# Patient Record
Sex: FEMALE | Race: BLACK OR AFRICAN AMERICAN | NOT HISPANIC OR LATINO | Employment: OTHER | URBAN - METROPOLITAN AREA
[De-identification: names, ages, dates, MRNs, and addresses within clinical notes are randomized per-mention and may not be internally consistent; named-entity substitution may affect disease eponyms.]

---

## 2024-09-09 ENCOUNTER — TELEPHONE (OUTPATIENT)
Dept: NEUROSURGERY | Facility: CLINIC | Age: 54
End: 2024-09-09
Payer: MEDICAID

## 2024-09-09 NOTE — TELEPHONE ENCOUNTER
Called to ask if patient had cd and report.  She stated no and I let her know ill try to get them pushed over.

## 2024-09-09 NOTE — PROGRESS NOTES
Subjective     Chief Complaint   Patient presents with    Back Pain         Previous Treatment:     HPI: Valery Cosme is a 53 y.o. female who presents to clinic with chronic low back pain.  Patient states that over the years she has been using heating pad and was recently prescribed gabapentin, meloxicam and hydrocodone.  Patient was just recently prescribed them so has not been able to have the full effect yet.  Patient states that she has low back pain that radiates down her bilateral lower extremities and ends at about her calf area.  Patient states that her right leg is more symptomatic than her left.  Patient states that she sometimes has difficulty walking due to the low back pain and also reports neurogenic claudication symptoms.  Patient admits to low back pain, pain and weakness in her lower extremities and muscle spasms.  Patient denies any numbness and tingling in her lower extremities, bowel or bladder incontinence or saddle anesthesia at this time.        PMH:  History reviewed. No pertinent past medical history.      Current Outpatient Medications:     Accu-Chek Guide test strip, TEST 2-3 TIMES A DAY, Disp: , Rfl:     albuterol sulfate  (90 Base) MCG/ACT inhaler, , Disp: , Rfl:     amitriptyline (ELAVIL) 25 MG tablet, Take 1 tablet by mouth every night at bedtime., Disp: , Rfl:     atorvastatin (LIPITOR) 80 MG tablet, Take 1 tablet by mouth every night at bedtime., Disp: , Rfl:     Blood Glucose Monitoring Suppl (Accu-Chek Guide Me) w/Device kit, TEST 2-3 TIMES PER DAY, Disp: , Rfl:     Buprenorphine HCl 150 MCG film, Apply  to cheek., Disp: , Rfl:     cetirizine (zyrTEC) 10 MG tablet, Take 1 tablet by mouth Daily., Disp: , Rfl:     gabapentin (NEURONTIN) 600 MG tablet, Take 1 tablet by mouth 3 times a day., Disp: , Rfl:     HYDROcodone-acetaminophen (NORCO) 7.5-325 MG per tablet, TAKE 1 TABLET BY MOUTH EVERY 6 HOURS AS NEEDED FOR 7 DAYS, Disp: , Rfl:     ibuprofen (ADVIL,MOTRIN) 800 MG  tablet, Take 1 tablet by mouth 3 times a day., Disp: , Rfl:     Lantus SoloStar 100 UNIT/ML injection pen, Inject 60 units every day by subcutaneous route in the evening for 30 days., Disp: , Rfl:     Lantus SoloStar 100 UNIT/ML injection pen, INJECT 60 UNITS EVERY DAY BY SUBCUTANEOUS ROUTE IN THE EVENING FOR 30 DAYS., Disp: , Rfl:     levothyroxine (SYNTHROID, LEVOTHROID) 150 MCG tablet, , Disp: , Rfl:     losartan (COZAAR) 50 MG tablet, Take 1 tablet by mouth Daily., Disp: , Rfl:     meloxicam (MOBIC) 15 MG tablet, TAKE 1 TABLET BY MOUTH EVERY DAY WITH A MEAL, Disp: , Rfl:     metFORMIN (GLUCOPHAGE) 1000 MG tablet, Take 1 tablet by mouth Every 12 (Twelve) Hours., Disp: , Rfl:     montelukast (SINGULAIR) 10 MG tablet, Take 1 tablet by mouth Daily., Disp: , Rfl:     pantoprazole (PROTONIX) 40 MG EC tablet, Take 1 tablet by mouth Daily., Disp: , Rfl:     tiZANidine (ZANAFLEX) 2 MG tablet, , Disp: , Rfl:     venlafaxine XR (EFFEXOR-XR) 75 MG 24 hr capsule, , Disp: , Rfl:      No Known Allergies     History reviewed. No pertinent surgical history.     History reviewed. No pertinent family history.      Social Hx:  Social History     Tobacco Use   Smoking Status Every Day    Current packs/day: 0.50    Types: Cigarettes    Passive exposure: Current   Smokeless Tobacco Never      Alcohol Use: Not on file      Social History     Substance and Sexual Activity   Drug Use Never          Review of Systems   Constitutional:  Positive for activity change.   HENT: Negative.     Eyes: Negative.    Respiratory: Negative.     Cardiovascular: Negative.    Gastrointestinal: Negative.    Endocrine: Negative.    Genitourinary: Negative.    Musculoskeletal:  Positive for arthralgias, back pain and myalgias.   Skin: Negative.    Allergic/Immunologic: Negative.    Neurological:  Positive for numbness (+tingling in hands).        Sharp stabbing pain that sometimes goes into legs    Psychiatric/Behavioral:  Positive for sleep disturbance.   "        Objective     /88   Pulse 88   Resp 18   Ht 165.1 cm (65\")   Wt 88 kg (194 lb)   SpO2 100%   BMI 32.28 kg/m²    Body mass index is 32.28 kg/m².      Physical Exam  Vitals reviewed.   Eyes:      Extraocular Movements: Extraocular movements intact.      Conjunctiva/sclera: Conjunctivae normal.      Pupils: Pupils are equal, round, and reactive to light.   Musculoskeletal:         General: Normal range of motion.      Cervical back: Normal range of motion.   Skin:     General: Skin is warm and dry.   Neurological:      General: No focal deficit present.      Mental Status: She is alert and oriented to person, place, and time.   Psychiatric:         Mood and Affect: Mood normal.         Speech: Speech normal.        Neurologic Exam     Mental Status   Oriented to person, place, and time.   Speech: speech is normal   Level of consciousness: alert  Knowledge: good.     Cranial Nerves     CN III, IV, VI   Pupils are equal, round, and reactive to light.    Motor Exam   Muscle bulk: normal  Right arm tone: normal  Left arm tone: normal  Right leg tone: normal  Left leg tone: normal    Strength   Right iliopsoas: 5/5  Left iliopsoas: 5/5  Right quadriceps: 5/5  Left quadriceps: 5/5  Right hamstrin/5  Left hamstrin/5  Right glutei: 5/5  Left glutei: 5/5  Right anterior tibial: 5/5  Left anterior tibial: 5/5  Right posterior tibial: 5/5  Left posterior tibial: 5/5          Results Review  I personally reviewed and interpreted the images from the following studies:     X-ray shows mild degenerative disc changes throughout low back            Assessment & Plan     MDM: Ivonnealmabrandie Cosme is a 53 y.o. female with a history of chronic low back pain.  In the past patient had used heating pads to help relieve her symptoms, but was recently prescribed gabapentin, meloxicam and Norco.  Unfortunately since she has just recently started these prescriptions and has not yet found relief.Patient has full " strength on physical exam and has no clonus present.  Patient has started physical therapy and going twice a week. I am ordering an MRI of her lumbar spine to look for any cord or nerve compression. Patient is to return to clinic after PT and her MRI is complete. Patient is to reach out with any questions or concerns.        Diagnosis Plan   1. Chronic bilateral low back pain with bilateral sciatica  MRI Lumbar Spine Without Contrast      2. Lumbar radiculopathy  MRI Lumbar Spine Without Contrast          Return After MRI and PT completed, for Recheck.        BMI cannot be calculated due to outdated height or weight values.  Please input a current height/weight in Vitals and re-renter BMIFOLLOWUP in Note to pull in correct documentation based on BMI range.         This patient was examined wearing appropriate personal protective equipment.            Chantelle Bowser PA-C    09/11/24  10:47 EDT      Part of this note may be an electronic transcription/translation of spoken language to printed text using the Dragon Dictation System.

## 2024-09-11 ENCOUNTER — OFFICE VISIT (OUTPATIENT)
Dept: NEUROSURGERY | Facility: CLINIC | Age: 54
End: 2024-09-11
Payer: MEDICAID

## 2024-09-11 VITALS
WEIGHT: 194 LBS | DIASTOLIC BLOOD PRESSURE: 88 MMHG | HEART RATE: 88 BPM | BODY MASS INDEX: 32.32 KG/M2 | OXYGEN SATURATION: 100 % | HEIGHT: 65 IN | SYSTOLIC BLOOD PRESSURE: 139 MMHG | RESPIRATION RATE: 18 BRPM

## 2024-09-11 DIAGNOSIS — M54.16 LUMBAR RADICULOPATHY: ICD-10-CM

## 2024-09-11 DIAGNOSIS — M54.41 CHRONIC BILATERAL LOW BACK PAIN WITH BILATERAL SCIATICA: Primary | ICD-10-CM

## 2024-09-11 DIAGNOSIS — G89.29 CHRONIC BILATERAL LOW BACK PAIN WITH BILATERAL SCIATICA: Primary | ICD-10-CM

## 2024-09-11 DIAGNOSIS — M54.42 CHRONIC BILATERAL LOW BACK PAIN WITH BILATERAL SCIATICA: Primary | ICD-10-CM

## 2024-09-11 PROCEDURE — 99203 OFFICE O/P NEW LOW 30 MIN: CPT

## 2024-09-11 PROCEDURE — 1160F RVW MEDS BY RX/DR IN RCRD: CPT

## 2024-09-11 PROCEDURE — 1159F MED LIST DOCD IN RCRD: CPT

## 2024-09-11 RX ORDER — INSULIN GLARGINE 100 [IU]/ML
INJECTION, SOLUTION SUBCUTANEOUS
COMMUNITY
Start: 2024-08-12

## 2024-09-11 RX ORDER — PANTOPRAZOLE SODIUM 40 MG/1
1 TABLET, DELAYED RELEASE ORAL DAILY
COMMUNITY
Start: 2024-08-12

## 2024-09-11 RX ORDER — LOSARTAN POTASSIUM 50 MG/1
1 TABLET ORAL DAILY
COMMUNITY
Start: 2024-09-08

## 2024-09-11 RX ORDER — VENLAFAXINE HYDROCHLORIDE 75 MG/1
CAPSULE, EXTENDED RELEASE ORAL
COMMUNITY
Start: 2024-09-08

## 2024-09-11 RX ORDER — ALBUTEROL SULFATE 90 UG/1
AEROSOL, METERED RESPIRATORY (INHALATION)
COMMUNITY
Start: 2024-09-08

## 2024-09-11 RX ORDER — MELOXICAM 15 MG/1
TABLET ORAL
COMMUNITY
Start: 2024-09-08

## 2024-09-11 RX ORDER — INSULIN GLARGINE 100 [IU]/ML
INJECTION, SOLUTION SUBCUTANEOUS
COMMUNITY

## 2024-09-11 RX ORDER — CETIRIZINE HYDROCHLORIDE 10 MG/1
1 TABLET ORAL DAILY
COMMUNITY
Start: 2024-06-02

## 2024-09-11 RX ORDER — LEVOTHYROXINE SODIUM 150 UG/1
TABLET ORAL
COMMUNITY
Start: 2024-09-08

## 2024-09-11 RX ORDER — ATORVASTATIN CALCIUM 80 MG/1
1 TABLET, FILM COATED ORAL
COMMUNITY

## 2024-09-11 RX ORDER — IBUPROFEN 800 MG/1
1 TABLET, FILM COATED ORAL 3 TIMES DAILY
COMMUNITY
Start: 2024-06-30

## 2024-09-11 RX ORDER — MONTELUKAST SODIUM 10 MG/1
1 TABLET ORAL DAILY
COMMUNITY
Start: 2024-08-12

## 2024-09-11 RX ORDER — GABAPENTIN 600 MG/1
1 TABLET ORAL 3 TIMES DAILY
COMMUNITY
Start: 2024-09-08

## 2024-09-11 RX ORDER — BLOOD SUGAR DIAGNOSTIC
STRIP MISCELLANEOUS
COMMUNITY
Start: 2024-08-26

## 2024-09-11 RX ORDER — HYDROCODONE BITARTRATE AND ACETAMINOPHEN 7.5; 325 MG/1; MG/1
TABLET ORAL
COMMUNITY

## 2024-09-11 RX ORDER — TIZANIDINE 2 MG/1
TABLET ORAL
COMMUNITY
Start: 2024-09-08

## 2024-09-11 RX ORDER — BLOOD-GLUCOSE METER
EACH MISCELLANEOUS
COMMUNITY
Start: 2024-08-26

## 2024-09-12 ENCOUNTER — PATIENT ROUNDING (BHMG ONLY) (OUTPATIENT)
Dept: NEUROSURGERY | Facility: CLINIC | Age: 54
End: 2024-09-12
Payer: MEDICAID

## 2024-09-23 ENCOUNTER — HOSPITAL ENCOUNTER (OUTPATIENT)
Dept: MRI IMAGING | Facility: HOSPITAL | Age: 54
Discharge: HOME | End: 2024-09-23
Payer: MEDICAID

## 2024-09-23 DIAGNOSIS — M54.41 CHRONIC BILATERAL LOW BACK PAIN WITH BILATERAL SCIATICA: ICD-10-CM

## 2024-09-23 DIAGNOSIS — M54.16 LUMBAR RADICULOPATHY: ICD-10-CM

## 2024-09-23 DIAGNOSIS — G89.29 CHRONIC BILATERAL LOW BACK PAIN WITH BILATERAL SCIATICA: ICD-10-CM

## 2024-09-23 DIAGNOSIS — M54.42 CHRONIC BILATERAL LOW BACK PAIN WITH BILATERAL SCIATICA: ICD-10-CM

## 2024-09-23 PROCEDURE — 72148 MRI LUMBAR SPINE W/O DYE: CPT

## 2024-12-16 NOTE — PROGRESS NOTES
Subjective     Chief Complaint   Patient presents with    Back Pain       HPI: Valery Cosme is a 54 y.o. female with  presents to clinic with chronic low back pain.  Patient states that over the years she has been using heating pad and was recently prescribed gabapentin, meloxicam and hydrocodone.  Patient was just recently prescribed them so has not been able to have the full effect yet.  Patient states that she has low back pain that radiates down her bilateral lower extremities and ends at about her calf area.  Patient states that her right leg is more symptomatic than her left.  Patient states that she sometimes has difficulty walking due to the low back pain and also reports neurogenic claudication symptoms.  Patient admits to low back pain, pain and weakness in her lower extremities and muscle spasms.  Patient denies any numbness and tingling in her lower extremities, bowel or bladder incontinence or saddle anesthesia at this time.     Interval history: 12/18/2024  Patient reports no significant changes today.  Continues to complain of low back pain going to bilateral legs along with numbness and tingling.  She also symptoms consistent with neurogenic claudication.  No new complaints today.  She did not get any meaningful relief with physical therapy.        Previous Treatment: Physical therapy    Previous Injections: None    Previous Neurosurgery: None      PAST MEDICAL HISTORY:    The following portions of the patient's history were reviewed and updated as appropriate: allergies, current medications, past family history, past medical history, past social history, past surgical history, and problem list.      Review of Systems   Constitutional:  Positive for activity change.   HENT: Negative.     Eyes: Negative.    Respiratory: Negative.     Cardiovascular: Negative.    Gastrointestinal: Negative.    Endocrine: Negative.    Genitourinary: Negative.    Musculoskeletal:  Positive for arthralgias, back  "pain and myalgias.   Skin: Negative.    Neurological:  Positive for numbness (+tingling her hands and lower back).        Ache that goes into her legs    Hematological: Negative.    Psychiatric/Behavioral:  Positive for sleep disturbance.          Objective     /89   Pulse 86   Resp 18   Ht 165.1 cm (65\")   Wt 80.3 kg (177 lb)   SpO2 100%   BMI 29.45 kg/m²    Body mass index is 29.45 kg/m².      Physical Exam  Vitals reviewed.   Constitutional:       General: She is not in acute distress.     Appearance: Normal appearance. She is well-developed and well-groomed.   Pulmonary:      Effort: Pulmonary effort is normal. No respiratory distress.   Skin:     General: Skin is warm and dry.   Neurological:      Deep Tendon Reflexes:      Reflex Scores:       Patellar reflexes are 2+ on the right side and 2+ on the left side.       Achilles reflexes are 2+ on the right side and 2+ on the left side.     Comments:             Neurological Exam    Motor   Normal muscle tone. Strength is 5/5 in all four extremities except as noted.                                             Right                     Left   Iliopsoas                               5                          5   Quadriceps                           5                          5   Gastrocnemius                     5                           5   Anterior tibialis                      5                          5    Sensory  Light touch is normal in upper and lower extremities. Pinprick is normal in upper and lower extremities.     Reflexes  Deep tendon reflexes are 2+ and symmetric except as noted.                                            Right                      Left  Patellar                                2+                         2+  Achilles                                2+                         2+    Gait  Casual gait is normal including stance, stride, and arm swing.            Results Review  I personally reviewed and interpreted the " images from the following studies:    MRI lumbar spine without contrast  9/23/2024  Chronic degenerative changes most notable at L4-5 where there is a large broad-based disc bulge and prominent facet arthropathy resulting in moderate central stenosis and moderate to severe bilateral foraminal stenosis.      Assessment & Plan     MDM: Valery Cosme is a 54 y.o. female presenting with chronic low back pain, neurogenic claudication, and bilateral radicular symptoms.  She has no focal sensorimotor deficits on exam.  MRI shows degenerative changes with bilateral foraminal stenosis at L4-5.  She has failed medication management and formal physical therapy.    Based on the above I think patient may benefit from an L4-5 epidural steroid injection.  After this injection she should follow-up with either myself or Chantelle Bowser PA-C to evaluate her response.  Patient is agreeable to this plan.       Diagnosis Plan   1. Degeneration of intervertebral disc of lumbosacral region with discogenic back pain and lower extremity pain  Epidural Block      2. Spinal stenosis, lumbar region, with neurogenic claudication  Epidural Block      3. Lumbosacral radiculopathy  Epidural Block          Return in about 4 weeks (around 1/15/2025) for w/ Chantelle Bowser PA-C.      Valery Cosme  reports that she has been smoking cigarettes. She has been exposed to tobacco smoke. She has never used smokeless tobacco. I have educated her on the risk of diseases from using tobacco products such as cancer, COPD, and heart disease.         BMI is >= 30 and <35. (Class 1 Obesity). The following options were offered after discussion;: exercise counseling/recommendations and nutrition counseling/recommendations         This patient was examined wearing appropriate personal protective equipment.            Roman Ventura PA-C    12/23/24  13:01 EST      Part of this note may be an electronic transcription/translation of spoken language to printed  text using the Dragon Dictation System.

## 2024-12-18 ENCOUNTER — OFFICE VISIT (OUTPATIENT)
Dept: NEUROSURGERY | Facility: CLINIC | Age: 54
End: 2024-12-18
Payer: MEDICAID

## 2024-12-18 VITALS
RESPIRATION RATE: 18 BRPM | OXYGEN SATURATION: 100 % | BODY MASS INDEX: 29.49 KG/M2 | HEART RATE: 86 BPM | HEIGHT: 65 IN | SYSTOLIC BLOOD PRESSURE: 116 MMHG | DIASTOLIC BLOOD PRESSURE: 89 MMHG | WEIGHT: 177 LBS

## 2024-12-18 DIAGNOSIS — M51.372 DEGENERATION OF INTERVERTEBRAL DISC OF LUMBOSACRAL REGION WITH DISCOGENIC BACK PAIN AND LOWER EXTREMITY PAIN: Primary | ICD-10-CM

## 2024-12-18 DIAGNOSIS — M54.17 LUMBOSACRAL RADICULOPATHY: ICD-10-CM

## 2024-12-18 DIAGNOSIS — M48.062 SPINAL STENOSIS, LUMBAR REGION, WITH NEUROGENIC CLAUDICATION: ICD-10-CM

## 2024-12-18 PROCEDURE — 3079F DIAST BP 80-89 MM HG: CPT

## 2024-12-18 PROCEDURE — 99214 OFFICE O/P EST MOD 30 MIN: CPT

## 2024-12-18 PROCEDURE — 3074F SYST BP LT 130 MM HG: CPT

## 2024-12-18 RX ORDER — SEMAGLUTIDE 0.68 MG/ML
INJECTION, SOLUTION SUBCUTANEOUS
COMMUNITY

## 2024-12-18 RX ORDER — ACYCLOVIR 400 MG/1
TABLET ORAL
COMMUNITY
Start: 2024-11-24

## 2024-12-18 RX ORDER — NICOTINE 21 MG/24HR
1 PATCH, TRANSDERMAL 24 HOURS TRANSDERMAL DAILY
COMMUNITY
Start: 2024-09-16

## 2024-12-23 PROBLEM — F19.10 POLYSUBSTANCE ABUSE: Status: ACTIVE | Noted: 2022-03-21

## 2024-12-23 PROBLEM — M47.819 ARTHROPATHY OF SPINAL FACET JOINT: Status: ACTIVE | Noted: 2019-10-28

## 2024-12-23 PROBLEM — G57.81: Status: ACTIVE | Noted: 2020-07-28

## 2024-12-23 PROBLEM — E11.9 TYPE 2 DIABETES MELLITUS WITHOUT COMPLICATION, WITH LONG-TERM CURRENT USE OF INSULIN: Status: ACTIVE | Noted: 2021-01-15

## 2024-12-23 PROBLEM — G47.33 OBSTRUCTIVE SLEEP APNEA SYNDROME IN ADULT: Status: ACTIVE | Noted: 2018-04-04

## 2024-12-23 PROBLEM — Z72.0 TOBACCO ABUSE DISORDER: Status: ACTIVE | Noted: 2021-12-07

## 2024-12-23 PROBLEM — M51.9 LUMBAR DISC DISEASE: Status: ACTIVE | Noted: 2021-01-15

## 2024-12-23 PROBLEM — M19.90 OA (OSTEOARTHRITIS): Status: ACTIVE | Noted: 2021-12-07

## 2024-12-23 PROBLEM — Z79.4 TYPE 2 DIABETES MELLITUS WITHOUT COMPLICATION, WITH LONG-TERM CURRENT USE OF INSULIN: Status: ACTIVE | Noted: 2021-01-15

## 2025-01-05 ENCOUNTER — TELEPHONE (OUTPATIENT)
Dept: PAIN MEDICINE | Facility: HOSPITAL | Age: 55
End: 2025-01-05
Payer: MEDICAID

## 2025-01-05 NOTE — TELEPHONE ENCOUNTER
Left voicemail notifying patient that Dr. Patino cancelled cases tomorrow due to weather. Instructed to call office to reschedule.

## 2025-01-06 ENCOUNTER — TELEPHONE (OUTPATIENT)
Dept: PAIN MEDICINE | Facility: HOSPITAL | Age: 55
End: 2025-01-06
Payer: MEDICAID

## 2025-01-06 NOTE — TELEPHONE ENCOUNTER
Called and spoke to patient. She states she received the message about procedure being cancelled and to reschedule.  I assured patient she would be rescheduled soon and to call the office on Tuesday morning. Verbalized understanding.

## 2025-01-07 ENCOUNTER — TELEPHONE (OUTPATIENT)
Dept: PAIN MEDICINE | Facility: CLINIC | Age: 55
End: 2025-01-07
Payer: MEDICAID

## 2025-01-07 NOTE — TELEPHONE ENCOUNTER
Caller: Valery Cosme    Relationship to patient: Self    Best call back number: [phone]    Chief complaint: BACK PAIN     Type of visit: EPIDURAL     Requested date: ASAP      If rescheduling, when is the original appointment: 1-6-25     Additional notes:N/A

## 2025-01-10 ENCOUNTER — HOSPITAL ENCOUNTER (OUTPATIENT)
Dept: PAIN MEDICINE | Facility: HOSPITAL | Age: 55
Discharge: HOME | End: 2025-01-10
Payer: MEDICAID

## 2025-01-10 VITALS
TEMPERATURE: 96.9 F | SYSTOLIC BLOOD PRESSURE: 118 MMHG | RESPIRATION RATE: 16 BRPM | BODY MASS INDEX: 29.49 KG/M2 | OXYGEN SATURATION: 100 % | DIASTOLIC BLOOD PRESSURE: 78 MMHG | HEIGHT: 65 IN | WEIGHT: 177 LBS | HEART RATE: 76 BPM

## 2025-01-10 DIAGNOSIS — R52 PAIN: ICD-10-CM

## 2025-01-10 DIAGNOSIS — M54.16 LUMBAR RADICULITIS: Primary | ICD-10-CM

## 2025-01-10 PROCEDURE — 25010000002 METHYLPREDNISOLONE PER 40 MG: Performed by: ANESTHESIOLOGY

## 2025-01-10 PROCEDURE — 77003 FLUOROGUIDE FOR SPINE INJECT: CPT

## 2025-01-10 PROCEDURE — 25510000001 IOPAMIDOL 41 % SOLUTION: Performed by: ANESTHESIOLOGY

## 2025-01-10 RX ORDER — IOPAMIDOL 408 MG/ML
3 INJECTION, SOLUTION INTRATHECAL
Status: COMPLETED | OUTPATIENT
Start: 2025-01-10 | End: 2025-01-10

## 2025-01-10 RX ORDER — METHYLPREDNISOLONE ACETATE 40 MG/ML
40 INJECTION, SUSPENSION INTRA-ARTICULAR; INTRALESIONAL; INTRAMUSCULAR; SOFT TISSUE ONCE
Status: COMPLETED | OUTPATIENT
Start: 2025-01-10 | End: 2025-01-10

## 2025-01-10 RX ADMIN — IOPAMIDOL 3 ML: 408 INJECTION, SOLUTION INTRATHECAL at 09:22

## 2025-01-10 RX ADMIN — METHYLPREDNISOLONE ACETATE 40 MG: 40 INJECTION, SUSPENSION INTRA-ARTICULAR; INTRALESIONAL; INTRAMUSCULAR; INTRASYNOVIAL; SOFT TISSUE at 09:22

## 2025-01-10 NOTE — PROCEDURES
"Subjective   CC back pain, legs pain  Valery Cosme is a 54 y.o. female with lumbar radiculitis, neurogenic medication here for LESI.  Requested by neurosurgery.  No anticoagulation    Pain Assessment   Location of Pain: Lower Back, R Hip, L Hip,  Leg,   Description of Pain: Dull/Aching, Throbbing, Stabbing  Previous Pain Rating :8  Current Pain Ratin  Aggravating Factors: Activity  Alleviating Factors: Rest, Medication  Pain onset over 12 weeks ago  Pain interferes with ADL's  [place] back pain disability scale scanned in chart     The following portions of the patient's history were reviewed and updated as appropriate: allergies, current medications, past family history, past medical history, past social history, past surgical history and problem list.      Review of Systems  As in HPI  Objective   Physical Exam  Vitals reviewed.   Constitutional:       General: She is not in acute distress.  Pulmonary:      Effort: Pulmonary effort is normal.       /81 (BP Location: Left arm, Patient Position: Sitting)   Pulse 80   Temp 96.9 °F (36.1 °C) (Skin)   Resp 16   Ht 165.1 cm (65\")   Wt 80.3 kg (177 lb)   SpO2 97%   BMI 29.45 kg/m²     Assessment & Plan    underwent LESI    RTC 4-6 weeks or as needed for repeat    DATE OF PROCEDURE: 1/10/2025    PREOPERATIVE DIAGNOSIS: lumbar DDD/radiculitis    POSTOPERATIVE DIAGNOSIS: same    PROCEDURE PERFORMED:  lumbar Epidural Steroid Injection    The patient presents with a history of   lumbar degenerative disc disease with  radiculitis. The patient presents today for a  lumbar epidural steroid injection at level  L4/5.   The patient was seen in the preoperative area.  Patient's consent was obtained and updated.  Vitals were taken.  Patient was then brought to the procedure suite and placed in a prone position. The appropriate anatomic area was widely prepped with Chloraprep and draped in a sterile fashion. Noninvasive monitoring per routine anesthesia protocol " was placed.  Under fluoroscopic guidance using  AP view a 20 gauge styleted tuohy needle was passed through skin anesthetized with 1% Lidocaine without epinephrine.  The needle was advanced using the continuous loss of resistance to saline technique into the L4 epidural space. Needle tip placement in the epidural space was confirmed by loss of resistance and injection of 1 mL of  preservative free contrast.  Following this 8 mL of a solution containing  1 mL of 40 mg Depo-Medrol and 7 mL of preservative-free saline was carefully administered in the epidural space.  A sterile dressing was placed over the puncture site.    The patient tolerated the procedure with no complications . They were then brought to the post procedure area where they recovered nicely.

## 2025-01-10 NOTE — DISCHARGE INSTRUCTIONS

## 2025-01-13 ENCOUNTER — TELEPHONE (OUTPATIENT)
Dept: PAIN MEDICINE | Facility: HOSPITAL | Age: 55
End: 2025-01-13
Payer: MEDICAID

## 2025-02-17 NOTE — PROGRESS NOTES
Subjective     Chief Complaint   Patient presents with    Back Pain       HPI: Valery Cosme is a 54 y.o. female with presents to clinic with chronic low back pain.  Patient states that over the years she has been using heating pad and was recently prescribed gabapentin, meloxicam and hydrocodone.  Patient was just recently prescribed them so has not been able to have the full effect yet.  Patient states that she has low back pain that radiates down her bilateral lower extremities and ends at about her calf area.  Patient states that her right leg is more symptomatic than her left.  Patient states that she sometimes has difficulty walking due to the low back pain and also reports neurogenic claudication symptoms.  Patient admits to low back pain, pain and weakness in her lower extremities and muscle spasms.  Patient denies any numbness and tingling in her lower extremities, bowel or bladder incontinence or saddle anesthesia at this time.     Interval history: 12/18/2024  Patient reports no significant changes today.  Continues to complain of low back pain going to bilateral legs along with numbness and tingling.  She also symptoms consistent with neurogenic claudication.  No new complaints today.  She did not get any meaningful relief with physical therapy.     Interval history: 2/20/2025  Patient underwent L4-5 epidural steroid injection on 1/10/2025.  She reports moderate relief of her symptoms for approximately 1 week after this injection, after which her symptoms returned to previous severity.  Otherwise no significant changes to her symptomatology since her last evaluation no new complaints.        Previous Treatment: Physical therapy, oral narcotics, NSAIDs, muscle relaxers, oral steroids    Previous Injections: 1/10/25 - L4-5 epidural steroid injection: Moderate relief x 1 week    Previous Neurosurgery: None      PAST MEDICAL HISTORY:    The following portions of the patient's history were reviewed and  "updated as appropriate: allergies, current medications, past family history, past medical history, past social history, past surgical history, and problem list.      Review of Systems   Constitutional:  Positive for activity change.   HENT: Negative.     Eyes: Negative.    Respiratory: Negative.     Cardiovascular: Negative.    Gastrointestinal: Negative.    Endocrine: Negative.    Genitourinary: Negative.    Musculoskeletal:  Positive for arthralgias, back pain and myalgias.   Skin: Negative.    Allergic/Immunologic: Negative.    Neurological:  Positive for numbness (+tingling in her legs and her hands).        Ache that goes into her legs  She is having muscles spasms    Hematological: Negative.    Psychiatric/Behavioral:  Positive for sleep disturbance.          Objective     /74   Pulse 93   Resp 18   Ht 165.1 cm (65\")   Wt 90.7 kg (200 lb)   SpO2 98%   BMI 33.28 kg/m²    Body mass index is 33.28 kg/m².      Physical Exam  Vitals reviewed.   Constitutional:       General: She is not in acute distress.     Appearance: Normal appearance. She is well-developed and well-groomed.   Pulmonary:      Effort: Pulmonary effort is normal. No respiratory distress.   Skin:     General: Skin is warm and dry.   Neurological:      Deep Tendon Reflexes:      Reflex Scores:       Patellar reflexes are 2+ on the right side and 2+ on the left side.       Achilles reflexes are 2+ on the right side and 2+ on the left side.     Comments:             Neurological Exam    Motor   Normal muscle tone. Strength is 5/5 in all four extremities except as noted.                                             Right                     Left   Iliopsoas                               5                          5   Quadriceps                           5                          5   Gastrocnemius                     5                           5   Anterior tibialis                      5                          5    Sensory  Light touch is " normal in upper and lower extremities. Pinprick is normal in upper and lower extremities.     Reflexes  Deep tendon reflexes are 2+ and symmetric except as noted.                                            Right                      Left  Patellar                                2+                         2+  Achilles                                2+                         2+    Gait  Casual gait is normal including stance, stride, and arm swing.            Results Review  I personally reviewed and interpreted the images from the following studies:    No new imaging      Assessment & Plan     MDM: Valery Cosme is a 54 y.o. female presenting with chronic low back pain, neurogenic claudication, and bilateral radicular symptoms referable to the L5 dermatome.  MRI shows chronic degenerative changes with severe central and bilateral foraminal stenosis at L4-5 which correlates with her symptoms.  Patient has no focal sensorimotor deficits on exam.  She has failed extensive conservative measures including home exercise program, medication management, formal physical therapy, and lumbar epidural steroid injections.    Based on the above I think patient would benefit from surgical intervention at L4-5.  I am ordering flexion-extension studies to rule out dynamic instability.  I am also ordering an updated hemoglobin A1c as her last A1c from 5 months ago was 7.7.  I will have patient follow-up with Dr. Contreras for surgical evaluation once this is completed.  Patient is agreeable to this plan       Diagnosis Plan   1. Spinal stenosis, lumbar region, with neurogenic claudication  XR Spine Lumbar Complete With Flex & Ext      2. Lumbosacral radiculopathy at L5  XR Spine Lumbar Complete With Flex & Ext      3. Type 2 diabetes mellitus without complication, with long-term current use of insulin  Hemoglobin A1c          Return for surgical evaluation with Dr. Contreras.      Valery Cosme  reports that she has been smoking  cigarettes. She has a 7.5 pack-year smoking history. She has been exposed to tobacco smoke. She has never used smokeless tobacco. I have educated her on the risk of diseases from using tobacco products such as cancer, COPD, and heart disease.              This patient was examined wearing appropriate personal protective equipment.            Roman Ventura PA-C    02/24/25  09:37 EST      Part of this note may be an electronic transcription/translation of spoken language to printed text using the Dragon Dictation System.

## 2025-02-24 ENCOUNTER — OFFICE VISIT (OUTPATIENT)
Dept: NEUROSURGERY | Facility: CLINIC | Age: 55
End: 2025-02-24
Payer: MEDICAID

## 2025-02-24 VITALS
HEIGHT: 65 IN | BODY MASS INDEX: 33.32 KG/M2 | DIASTOLIC BLOOD PRESSURE: 74 MMHG | OXYGEN SATURATION: 98 % | HEART RATE: 93 BPM | WEIGHT: 200 LBS | RESPIRATION RATE: 18 BRPM | SYSTOLIC BLOOD PRESSURE: 135 MMHG

## 2025-02-24 DIAGNOSIS — M48.062 SPINAL STENOSIS, LUMBAR REGION, WITH NEUROGENIC CLAUDICATION: Primary | ICD-10-CM

## 2025-02-24 DIAGNOSIS — M54.17 LUMBOSACRAL RADICULOPATHY AT L5: ICD-10-CM

## 2025-02-24 DIAGNOSIS — Z79.4 TYPE 2 DIABETES MELLITUS WITHOUT COMPLICATION, WITH LONG-TERM CURRENT USE OF INSULIN: ICD-10-CM

## 2025-02-24 DIAGNOSIS — E11.9 TYPE 2 DIABETES MELLITUS WITHOUT COMPLICATION, WITH LONG-TERM CURRENT USE OF INSULIN: ICD-10-CM

## 2025-02-24 PROCEDURE — 3078F DIAST BP <80 MM HG: CPT

## 2025-02-24 PROCEDURE — 1160F RVW MEDS BY RX/DR IN RCRD: CPT

## 2025-02-24 PROCEDURE — 99214 OFFICE O/P EST MOD 30 MIN: CPT

## 2025-02-24 PROCEDURE — 3075F SYST BP GE 130 - 139MM HG: CPT

## 2025-02-24 PROCEDURE — 1159F MED LIST DOCD IN RCRD: CPT

## 2025-02-24 RX ORDER — OMEPRAZOLE 40 MG/1
1 CAPSULE, DELAYED RELEASE ORAL DAILY
COMMUNITY
Start: 2025-01-03

## 2025-06-17 NOTE — PROGRESS NOTES
"Subjective   History of Present Illness: Valery Cosme is a 54 y.o. female is here today for follow-up. Today patient reports low back pain into bilateral legs.  Patient says she has pain in her low back as well as some pain that will radiate into her legs bilaterally primarily when she is standing and walking.  The pain tends to improve when she sits down.  Pain is more severe with any activity or bending.  She has undergone physical therapy as well as epidural injections without any lasting relief.      Chief Complaint   Patient presents with    Back Pain          Previous treatment:   Corticosteroids, NSAID'S, Muscle Relaxants, Greater than 6 weeks of physical therapy, Narcotic's, and Topical anesthetics    Previous neurosurgery:  None    Previous injections:    1/10/25 - L4-5 epidural steroid injection: Moderate relief x 1 week     The following portions of the patient's history were reviewed and updated as appropriate: allergies, current medications, past family history, past medical history, past social history, past surgical history, and problem list.    Review of Systems   Constitutional:  Positive for activity change.   HENT: Negative.     Eyes: Negative.    Respiratory: Negative.     Cardiovascular: Negative.    Gastrointestinal: Negative.    Endocrine: Negative.    Genitourinary: Negative.    Musculoskeletal:  Positive for arthralgias, back pain (centralized) and myalgias.   Skin: Negative.    Allergic/Immunologic: Negative.    Neurological:  Positive for weakness (bilateral legs) and numbness (tingling/legs).   Hematological: Negative.    Psychiatric/Behavioral:  Positive for sleep disturbance.        Objective      /71 (BP Location: Left arm, Patient Position: Sitting)   Pulse 79   Ht 165.1 cm (65\")   SpO2 96%   BMI 33.28 kg/m²    Body mass index is 33.28 kg/m².  Vitals:    06/18/25 0942   PainSc: 7          Neurological Exam  Mental Status  Awake, alert and oriented to person, place and " time.    Motor   Strength is 5/5 throughout all four extremities.    Sensory  Sensation is intact to light touch, pinprick, vibration and proprioception in all four extremities.    Reflexes    Right pathological reflexes: Rosita's absent.  Left pathological reflexes: Rosita's absent.          Assessment & Plan   Independent Review of Radiographic Studies:      I personally reviewed and interpreted the images from the following studies.    MRI lumbar spine: Moderate disc degeneration and severe facet arthropathy at L4-5 with grade 1 spondylolisthesis and moderate to severe central and foraminal stenosis.  There are more mild degenerative changes at L3-4 with mild to moderate stenosis.  No other high-grade central or foraminal stenosis    Medical Decision Making:      Valery Cosme is a 54 y.o. female with a history of worsening symptoms of low back pain neurogenic claudication and radiculopathy with spondylolisthesis of L4-5 and significant central and foraminal stenosis.  Patient has failed all conservative measures and would benefit from surgical intervention in the form of L4-5 TLIF.  We will get lumbar flexion-extension x-rays prior to surgery to ensure there is no instability at L3-4 or L5-S1.      Diagnoses and all orders for this visit:    1. Spondylolisthesis of lumbar region (Primary)    2. Lumbar radiculopathy    3. Lumbar stenosis with neurogenic claudication    4. Degeneration of intervertebral disc of lumbar region with discogenic back pain and lower extremity pain      No follow-ups on file.    This patient was examined wearing appropriate personal protective equipment.                      Dr. Paulo Contreras IV    06/18/25  10:30 EDT

## 2025-06-18 ENCOUNTER — OFFICE VISIT (OUTPATIENT)
Dept: NEUROSURGERY | Facility: CLINIC | Age: 55
End: 2025-06-18
Payer: MEDICAID

## 2025-06-18 ENCOUNTER — PREP FOR SURGERY (OUTPATIENT)
Dept: OTHER | Facility: HOSPITAL | Age: 55
End: 2025-06-18
Payer: MEDICAID

## 2025-06-18 VITALS
HEIGHT: 65 IN | SYSTOLIC BLOOD PRESSURE: 101 MMHG | OXYGEN SATURATION: 96 % | BODY MASS INDEX: 33.28 KG/M2 | DIASTOLIC BLOOD PRESSURE: 71 MMHG | HEART RATE: 79 BPM

## 2025-06-18 DIAGNOSIS — M54.16 LUMBAR RADICULOPATHY: ICD-10-CM

## 2025-06-18 DIAGNOSIS — M43.16 SPONDYLOLISTHESIS OF LUMBAR REGION: Primary | ICD-10-CM

## 2025-06-18 DIAGNOSIS — M51.362 DEGENERATION OF INTERVERTEBRAL DISC OF LUMBAR REGION WITH DISCOGENIC BACK PAIN AND LOWER EXTREMITY PAIN: ICD-10-CM

## 2025-06-18 DIAGNOSIS — M48.062 LUMBAR STENOSIS WITH NEUROGENIC CLAUDICATION: ICD-10-CM

## 2025-07-02 ENCOUNTER — TELEPHONE (OUTPATIENT)
Dept: NEUROSURGERY | Facility: CLINIC | Age: 55
End: 2025-07-02

## 2025-07-02 NOTE — TELEPHONE ENCOUNTER
Caller: Valery Cosme    Relationship: Self    Best call back number: [phone]    What was the call regarding: PATIENT CALLED FOR AN UPDATE REGARDING SURGERY SCHEDULING, AS SHE SAW DR FULLER ON 6/18/25 AND HAS NOT HAD WORD. PLEASE CALL PATIENT TO ADVISE.

## 2025-07-07 ENCOUNTER — TELEPHONE (OUTPATIENT)
Dept: NEUROSURGERY | Facility: CLINIC | Age: 55
End: 2025-07-07
Payer: MEDICAID

## 2025-07-07 NOTE — TELEPHONE ENCOUNTER
Called the patient about surgery, I advised that Dr. Contreras needs the XR before we can schedule surgery. She understands that she does not need an appointment for the XR. She will arrange transportation to get the XR.

## 2025-08-13 ENCOUNTER — HOSPITAL ENCOUNTER (OUTPATIENT)
Dept: CT IMAGING | Facility: HOSPITAL | Age: 55
Discharge: HOME | End: 2025-08-13
Payer: COMMERCIAL

## 2025-08-13 ENCOUNTER — HOSPITAL ENCOUNTER (OUTPATIENT)
Dept: GENERAL RADIOLOGY | Facility: HOSPITAL | Age: 55
Discharge: HOME | End: 2025-08-13
Payer: COMMERCIAL

## 2025-08-13 ENCOUNTER — HOSPITAL ENCOUNTER (OUTPATIENT)
Dept: CARDIOLOGY | Facility: HOSPITAL | Age: 55
Discharge: HOME | End: 2025-08-13
Payer: COMMERCIAL

## 2025-08-13 ENCOUNTER — LAB (OUTPATIENT)
Dept: LAB | Facility: HOSPITAL | Age: 55
End: 2025-08-13
Payer: COMMERCIAL

## 2025-08-13 DIAGNOSIS — M43.16 SPONDYLOLISTHESIS OF LUMBAR REGION: ICD-10-CM

## 2025-08-13 LAB
ABO GROUP BLD: NORMAL
ANION GAP SERPL CALCULATED.3IONS-SCNC: 14 MMOL/L (ref 5–15)
BASOPHILS # BLD AUTO: 0.04 10*3/MM3 (ref 0–0.2)
BASOPHILS NFR BLD AUTO: 0.3 % (ref 0–1.5)
BILIRUB UR QL STRIP: NEGATIVE
BLD GP AB SCN SERPL QL: NEGATIVE
BUN SERPL-MCNC: 16 MG/DL (ref 6–20)
BUN/CREAT SERPL: 17.2 (ref 7–25)
CALCIUM SPEC-SCNC: 9.6 MG/DL (ref 8.6–10.5)
CHLORIDE SERPL-SCNC: 99 MMOL/L (ref 98–107)
CLARITY UR: CLEAR
CO2 SERPL-SCNC: 22 MMOL/L (ref 22–29)
COLOR UR: YELLOW
CREAT SERPL-MCNC: 0.93 MG/DL (ref 0.57–1)
DEPRECATED RDW RBC AUTO: 49.5 FL (ref 37–54)
EGFRCR SERPLBLD CKD-EPI 2021: 73.2 ML/MIN/1.73
EOSINOPHIL # BLD AUTO: 0.41 10*3/MM3 (ref 0–0.4)
EOSINOPHIL NFR BLD AUTO: 2.6 % (ref 0.3–6.2)
ERYTHROCYTE [DISTWIDTH] IN BLOOD BY AUTOMATED COUNT: 15.3 % (ref 12.3–15.4)
GLUCOSE SERPL-MCNC: 64 MG/DL (ref 65–99)
GLUCOSE UR STRIP-MCNC: NEGATIVE MG/DL
HBA1C MFR BLD: 7.27 % (ref 4.8–5.6)
HCT VFR BLD AUTO: 34.1 % (ref 34–46.6)
HGB BLD-MCNC: 11.1 G/DL (ref 12–15.9)
HGB UR QL STRIP.AUTO: NEGATIVE
IMM GRANULOCYTES # BLD AUTO: 0.06 10*3/MM3 (ref 0–0.05)
IMM GRANULOCYTES NFR BLD AUTO: 0.4 % (ref 0–0.5)
INR PPP: 0.93 (ref 0.9–1.1)
KETONES UR QL STRIP: NEGATIVE
LEUKOCYTE ESTERASE UR QL STRIP.AUTO: ABNORMAL
LYMPHOCYTES # BLD AUTO: 4.4 10*3/MM3 (ref 0.7–3.1)
LYMPHOCYTES NFR BLD AUTO: 28.2 % (ref 19.6–45.3)
MCH RBC QN AUTO: 28.6 PG (ref 26.6–33)
MCHC RBC AUTO-ENTMCNC: 32.6 G/DL (ref 31.5–35.7)
MCV RBC AUTO: 87.9 FL (ref 79–97)
MONOCYTES # BLD AUTO: 0.87 10*3/MM3 (ref 0.1–0.9)
MONOCYTES NFR BLD AUTO: 5.6 % (ref 5–12)
MRSA DNA SPEC QL NAA+PROBE: NORMAL
NEUTROPHILS NFR BLD AUTO: 62.9 % (ref 42.7–76)
NEUTROPHILS NFR BLD AUTO: 9.83 10*3/MM3 (ref 1.7–7)
NITRITE UR QL STRIP: NEGATIVE
NRBC BLD AUTO-RTO: 0 /100 WBC (ref 0–0.2)
PH UR STRIP.AUTO: 6.5 [PH] (ref 5–8)
PLATELET # BLD AUTO: 450 10*3/MM3 (ref 140–450)
PMV BLD AUTO: 10.2 FL (ref 6–12)
POTASSIUM SERPL-SCNC: 4.6 MMOL/L (ref 3.5–5.2)
PROT UR QL STRIP: NEGATIVE
PROTHROMBIN TIME: 12.4 SECONDS (ref 11.7–14.2)
QT INTERVAL: 375 MS
QTC INTERVAL: 426 MS
RBC # BLD AUTO: 3.88 10*6/MM3 (ref 3.77–5.28)
RH BLD: POSITIVE
SODIUM SERPL-SCNC: 135 MMOL/L (ref 136–145)
SP GR UR STRIP: 1.01 (ref 1–1.03)
T&S EXPIRATION DATE: NORMAL
UROBILINOGEN UR QL STRIP: ABNORMAL
WBC NRBC COR # BLD AUTO: 15.61 10*3/MM3 (ref 3.4–10.8)

## 2025-08-13 PROCEDURE — 72131 CT LUMBAR SPINE W/O DYE: CPT

## 2025-08-13 PROCEDURE — 93005 ELECTROCARDIOGRAM TRACING: CPT | Performed by: NEUROLOGICAL SURGERY

## 2025-08-13 PROCEDURE — 86850 RBC ANTIBODY SCREEN: CPT

## 2025-08-13 PROCEDURE — 83036 HEMOGLOBIN GLYCOSYLATED A1C: CPT

## 2025-08-13 PROCEDURE — 72120 X-RAY BEND ONLY L-S SPINE: CPT

## 2025-08-13 PROCEDURE — 86900 BLOOD TYPING SEROLOGIC ABO: CPT

## 2025-08-13 PROCEDURE — 80048 BASIC METABOLIC PNL TOTAL CA: CPT

## 2025-08-13 PROCEDURE — 86901 BLOOD TYPING SEROLOGIC RH(D): CPT

## 2025-08-13 PROCEDURE — 81003 URINALYSIS AUTO W/O SCOPE: CPT

## 2025-08-13 PROCEDURE — 85025 COMPLETE CBC W/AUTO DIFF WBC: CPT

## 2025-08-13 PROCEDURE — 87641 MR-STAPH DNA AMP PROBE: CPT

## 2025-08-13 PROCEDURE — 85610 PROTHROMBIN TIME: CPT

## 2025-08-13 PROCEDURE — 36415 COLL VENOUS BLD VENIPUNCTURE: CPT

## 2025-08-15 ENCOUNTER — TELEPHONE (OUTPATIENT)
Dept: NEUROSURGERY | Facility: CLINIC | Age: 55
End: 2025-08-15
Payer: COMMERCIAL

## 2025-08-21 ENCOUNTER — TELEPHONE (OUTPATIENT)
Dept: NEUROSURGERY | Facility: CLINIC | Age: 55
End: 2025-08-21
Payer: COMMERCIAL